# Patient Record
Sex: FEMALE | Race: WHITE | NOT HISPANIC OR LATINO
[De-identification: names, ages, dates, MRNs, and addresses within clinical notes are randomized per-mention and may not be internally consistent; named-entity substitution may affect disease eponyms.]

---

## 2021-08-19 ENCOUNTER — FORM ENCOUNTER (OUTPATIENT)
Age: 76
End: 2021-08-19

## 2022-01-13 PROBLEM — Z00.00 ENCOUNTER FOR PREVENTIVE HEALTH EXAMINATION: Status: ACTIVE | Noted: 2022-01-13

## 2022-02-04 ENCOUNTER — APPOINTMENT (OUTPATIENT)
Dept: NEUROLOGY | Facility: CLINIC | Age: 77
End: 2022-02-04
Payer: MEDICARE

## 2022-02-04 ENCOUNTER — NON-APPOINTMENT (OUTPATIENT)
Age: 77
End: 2022-02-04

## 2022-02-04 VITALS
HEART RATE: 78 BPM | BODY MASS INDEX: 17.75 KG/M2 | HEIGHT: 61 IN | TEMPERATURE: 97.9 F | OXYGEN SATURATION: 98 % | DIASTOLIC BLOOD PRESSURE: 69 MMHG | SYSTOLIC BLOOD PRESSURE: 110 MMHG | WEIGHT: 94 LBS

## 2022-02-04 DIAGNOSIS — M79.2 NEURALGIA AND NEURITIS, UNSPECIFIED: ICD-10-CM

## 2022-02-04 DIAGNOSIS — M54.2 CERVICALGIA: ICD-10-CM

## 2022-02-04 PROCEDURE — 99205 OFFICE O/P NEW HI 60 MIN: CPT

## 2022-02-04 NOTE — HISTORY OF PRESENT ILLNESS
[FreeTextEntry1] : The patient is a 76-year-old female with a history of degenerative disease of the spine presenting for evaluation of possible TIA early December.\par \par The patient states she was sitting with her  and suddenly had difficulty expressing herself.  Her speech was garbled.  She had no other associated deficits.  Symptoms lasted perhaps up to 1 minute before resolving.  She may or may not have had a mild headache.  She did not seek medical attention immediately.  However, her cardiologist recently began the work-up and performed a carotid ultrasound and transthoracic echocardiogram which were unremarkable per her report.  She has not had cardiac monitoring.  She has not started aspirin as she has had difficulties with nosebleeds in the past.\par \par Otherwise, the patient describes a episode in October 2021 of severe, sudden onset posterior neck pain.  The pain was so severe that she began crying and presented to the ER.  She had a cervical x-ray performed that showed degenerative changes in the cervical spine.  She has been doing physical therapy with some improvement.  She also has noted pain in her left arm which has been attributed to possible shoulder issue.

## 2022-02-04 NOTE — ASSESSMENT
[FreeTextEntry1] : Patient is a very pleasant 76-year-old female presenting for evaluation of possible TIA early December.  Symptoms are concerning.  We will complete the TIA work-up with MRI brain, head and neck vessel imaging, and prolonged cardiac monitoring.  If MRI is negative for hemorrhage, she will start aspirin 81 mg every other day given her history of nosebleeds.  She will send me the results of recent lab work from her PCP.\par \par Otherwise, given the patient's persistent neck discomfort and possible radicular pain to the left arm, I have ordered an MRI cervical spine for further evaluation.  She will continue therapy in the interim.

## 2022-02-04 NOTE — PHYSICAL EXAM
[FreeTextEntry1] : Alert.  Fully oriented.  Speech and language are intact.  Cranial nerves II-XII are intact.  Motor exam reveals intact strength with individual muscle testing in bilateral upper and lower extremities.  Tone is normal.  Reflexes are normal.  Toes are downgoing.  Sensation is intact to light touch in distal extremities.  Finger-to-nose and heel-to-shin are intact.  Rapid alternating movements are normal in the upper and lower extremities.  Gait is normal. She is able to walk on heels, toes, and in tandem without difficulty. Romberg negative.\par

## 2022-02-13 ENCOUNTER — RESULT REVIEW (OUTPATIENT)
Age: 77
End: 2022-02-13

## 2022-02-13 ENCOUNTER — OUTPATIENT (OUTPATIENT)
Dept: OUTPATIENT SERVICES | Facility: HOSPITAL | Age: 77
LOS: 1 days | End: 2022-02-13

## 2022-02-13 ENCOUNTER — APPOINTMENT (OUTPATIENT)
Dept: MRI IMAGING | Facility: CLINIC | Age: 77
End: 2022-02-13
Payer: MEDICARE

## 2022-02-13 DIAGNOSIS — Z90.89 ACQUIRED ABSENCE OF OTHER ORGANS: Chronic | ICD-10-CM

## 2022-02-13 PROCEDURE — 72141 MRI NECK SPINE W/O DYE: CPT | Mod: 26,ME

## 2022-02-13 PROCEDURE — 70544 MR ANGIOGRAPHY HEAD W/O DYE: CPT | Mod: 26,MG,59

## 2022-02-13 PROCEDURE — 70547 MR ANGIOGRAPHY NECK W/O DYE: CPT | Mod: 26,MG

## 2022-02-13 PROCEDURE — G1004: CPT

## 2022-02-13 PROCEDURE — 70551 MRI BRAIN STEM W/O DYE: CPT | Mod: 26,ME

## 2022-02-14 ENCOUNTER — TRANSCRIPTION ENCOUNTER (OUTPATIENT)
Age: 77
End: 2022-02-14

## 2022-02-14 ENCOUNTER — NON-APPOINTMENT (OUTPATIENT)
Age: 77
End: 2022-02-14

## 2022-03-25 ENCOUNTER — TRANSCRIPTION ENCOUNTER (OUTPATIENT)
Age: 77
End: 2022-03-25

## 2022-04-22 ENCOUNTER — APPOINTMENT (OUTPATIENT)
Dept: NEUROLOGY | Facility: CLINIC | Age: 77
End: 2022-04-22
Payer: MEDICARE

## 2022-04-22 VITALS
SYSTOLIC BLOOD PRESSURE: 132 MMHG | BODY MASS INDEX: 17.3 KG/M2 | WEIGHT: 94 LBS | HEART RATE: 79 BPM | OXYGEN SATURATION: 98 % | HEIGHT: 62 IN | TEMPERATURE: 98.3 F | DIASTOLIC BLOOD PRESSURE: 70 MMHG

## 2022-04-22 DIAGNOSIS — G45.9 TRANSIENT CEREBRAL ISCHEMIC ATTACK, UNSPECIFIED: ICD-10-CM

## 2022-04-22 PROCEDURE — 99214 OFFICE O/P EST MOD 30 MIN: CPT

## 2022-04-22 NOTE — ASSESSMENT
[FreeTextEntry1] : The patient is a 76-year-old female with a history of degenerative disease of the spine and anxiety presenting for evaluation of suspected TIA early December here for follow-up.  Thankfully, the patient has been doing well.  She has had no recurrent strokelike symptoms.  I have again encouraged her to consider daily aspirin for optimal benefit.  Given the presence of atrial flutter, however, my preference is actually to transition to Eliquis for better stroke prevention.  Before doing so, I would like to confirm the rhythm was in fact atrial flutter with the patient's cardiologist.  Ideally she would also be on a statin as well.  \par \par We discussed in detail the pathophysiology of small vessel disease including the etiology as well as preventative measures including antiplatelet therapy, statin therapy to control cholesterol to goal LDL less than 70, normal BP, and an otherwise healthy lifestyle through diet, exercise, etc.\par \par RTC 6 months.\par

## 2022-04-22 NOTE — HISTORY OF PRESENT ILLNESS
[FreeTextEntry1] : The patient is a 76-year-old female with a history of degenerative disease of the spine and anxiety presenting for evaluation of suspected TIA early December. She is here for follow-up.\par \par Since our last visit, the patient underwent MRI which showed evidence of small vessel disease within the leonid and the cerebral hemispheres.  There were no acute findings.  MRA of the neck showed no significant stenosis.  There was a possible infundibulum of the right supraclinoid ICA on MRA head.  MRI C-spine showed known degenerative changes.  She underwent a 30-day cardiac monitor, the results of which are scanned into the system.  This did reveal reveal atrial flutter.  Otherwise, the patient has been taking aspirin twice per week after discussing her concern for nosebleeds with her PCP given her past history.  If tolerated, she plans to increase to 3 times weekly.  She is not on statin therapy.\par

## 2022-04-29 ENCOUNTER — TRANSCRIPTION ENCOUNTER (OUTPATIENT)
Age: 77
End: 2022-04-29

## 2022-05-02 ENCOUNTER — TRANSCRIPTION ENCOUNTER (OUTPATIENT)
Age: 77
End: 2022-05-02

## 2022-05-26 ENCOUNTER — APPOINTMENT (OUTPATIENT)
Dept: CT IMAGING | Facility: CLINIC | Age: 77
End: 2022-05-26
Payer: SELF-PAY

## 2022-05-26 ENCOUNTER — OUTPATIENT (OUTPATIENT)
Dept: OUTPATIENT SERVICES | Facility: HOSPITAL | Age: 77
LOS: 1 days | End: 2022-05-26

## 2022-05-26 DIAGNOSIS — Z90.89 ACQUIRED ABSENCE OF OTHER ORGANS: Chronic | ICD-10-CM

## 2022-05-26 PROCEDURE — 75571 CT HRT W/O DYE W/CA TEST: CPT | Mod: 26

## 2022-06-27 ENCOUNTER — TRANSCRIPTION ENCOUNTER (OUTPATIENT)
Age: 77
End: 2022-06-27

## 2022-06-29 ENCOUNTER — TRANSCRIPTION ENCOUNTER (OUTPATIENT)
Age: 77
End: 2022-06-29

## 2022-11-11 ENCOUNTER — TRANSCRIPTION ENCOUNTER (OUTPATIENT)
Age: 77
End: 2022-11-11

## 2022-11-22 ENCOUNTER — APPOINTMENT (OUTPATIENT)
Dept: NEUROLOGY | Facility: CLINIC | Age: 77
End: 2022-11-22

## 2023-01-03 ENCOUNTER — FORM ENCOUNTER (OUTPATIENT)
Age: 78
End: 2023-01-03

## 2023-01-04 ENCOUNTER — APPOINTMENT (OUTPATIENT)
Dept: NEUROLOGY | Facility: CLINIC | Age: 78
End: 2023-01-04

## 2023-01-13 ENCOUNTER — APPOINTMENT (OUTPATIENT)
Dept: NEUROLOGY | Facility: CLINIC | Age: 78
End: 2023-01-13

## 2023-05-05 ENCOUNTER — APPOINTMENT (OUTPATIENT)
Dept: NEUROLOGY | Facility: CLINIC | Age: 78
End: 2023-05-05
Payer: MEDICARE

## 2023-05-05 DIAGNOSIS — R47.01 APHASIA: ICD-10-CM

## 2023-05-05 PROCEDURE — 99214 OFFICE O/P EST MOD 30 MIN: CPT | Mod: 95

## 2023-05-05 NOTE — HISTORY OF PRESENT ILLNESS
[Home] : at home, [unfilled] , at the time of the visit. [Medical Office: ([facility])___] : at the medical office located in  [Verbal consent obtained from patient] : the patient, [unfilled] [FreeTextEntry1] : The patient is a 77-year-old female with a history of degenerative disease of the spine and anxiety presenting for evaluation of a possible TIA in 12/2021. She is here for follow-up.\par \par SInce our last visit, the patient states she has been doing well. She had one 2-3 second episode of expressive speech difficulties w/o other associated symptoms, very similar to her prior event which led to the initial consultation. She has not had any other symptoms. Of note, prior MRA head/neck showed no significant stenosis and cardiac workup was also unrevealing. She has been taking aspirin 2X per week but after this episode, increased to 3 times per week. She is not on statin therapy.

## 2023-05-05 NOTE — ASSESSMENT
[FreeTextEntry1] : The patient is a 77-year-old female with a history of degenerative disease of the spine and anxiety presenting for evaluation of a possible TIA of isolated expressive aphasia in 12/2021 with a recurrent, similar spell more recently.  We discussed differential includes TIA vs focal seizures and less likely early symptoms of PPA.  She would like ot minimize testing if possible. Plan for MRA head/neck (will also include brain MRI). She is seeing her cardiologist soon so I will also reach out to her. Ideally would have longer term cardiac monitoring. Rec she take daily aspirin and statin therapy which she has not been taking at the recommendation of her PCP/cardiologist. Routine EEG to eval for focal seizures. PET Brain could be considered for PPA eval but given low suspicion, will hold off for now.

## 2023-05-09 ENCOUNTER — TRANSCRIPTION ENCOUNTER (OUTPATIENT)
Age: 78
End: 2023-05-09

## 2023-05-10 ENCOUNTER — TRANSCRIPTION ENCOUNTER (OUTPATIENT)
Age: 78
End: 2023-05-10

## 2023-05-15 ENCOUNTER — TRANSCRIPTION ENCOUNTER (OUTPATIENT)
Age: 78
End: 2023-05-15

## 2023-05-25 ENCOUNTER — APPOINTMENT (OUTPATIENT)
Dept: NEUROLOGY | Facility: CLINIC | Age: 78
End: 2023-05-25
Payer: MEDICARE

## 2023-05-25 PROCEDURE — 95819 EEG AWAKE AND ASLEEP: CPT

## 2023-05-30 ENCOUNTER — OUTPATIENT (OUTPATIENT)
Dept: OUTPATIENT SERVICES | Facility: HOSPITAL | Age: 78
LOS: 1 days | End: 2023-05-30
Payer: MEDICARE

## 2023-05-30 ENCOUNTER — APPOINTMENT (OUTPATIENT)
Dept: MRI IMAGING | Facility: HOSPITAL | Age: 78
End: 2023-05-30

## 2023-05-30 ENCOUNTER — RESULT REVIEW (OUTPATIENT)
Age: 78
End: 2023-05-30

## 2023-05-30 DIAGNOSIS — Z90.89 ACQUIRED ABSENCE OF OTHER ORGANS: Chronic | ICD-10-CM

## 2023-05-30 PROCEDURE — 70547 MR ANGIOGRAPHY NECK W/O DYE: CPT | Mod: 26,MH

## 2023-05-30 PROCEDURE — 70547 MR ANGIOGRAPHY NECK W/O DYE: CPT | Mod: MH

## 2023-05-30 PROCEDURE — 70544 MR ANGIOGRAPHY HEAD W/O DYE: CPT | Mod: MH

## 2023-05-30 PROCEDURE — 70544 MR ANGIOGRAPHY HEAD W/O DYE: CPT | Mod: 26,MH

## 2023-07-25 ENCOUNTER — TRANSCRIPTION ENCOUNTER (OUTPATIENT)
Age: 78
End: 2023-07-25

## 2023-07-27 ENCOUNTER — APPOINTMENT (OUTPATIENT)
Dept: NEUROLOGY | Facility: CLINIC | Age: 78
End: 2023-07-27
Payer: MEDICARE

## 2023-07-28 ENCOUNTER — APPOINTMENT (OUTPATIENT)
Dept: NEUROLOGY | Facility: CLINIC | Age: 78
End: 2023-07-28

## 2023-07-28 PROCEDURE — 95700 EEG CONT REC W/VID EEG TECH: CPT

## 2023-07-28 PROCEDURE — 95708 EEG WO VID EA 12-26HR UNMNTR: CPT

## 2023-07-28 PROCEDURE — 95719 EEG PHYS/QHP EA INCR W/O VID: CPT

## 2023-08-02 ENCOUNTER — APPOINTMENT (OUTPATIENT)
Dept: NEUROLOGY | Facility: CLINIC | Age: 78
End: 2023-08-02

## 2023-08-03 ENCOUNTER — TRANSCRIPTION ENCOUNTER (OUTPATIENT)
Age: 78
End: 2023-08-03

## 2024-01-08 NOTE — END OF VISIT
[Time Spent: ___ minutes] : I have spent [unfilled] minutes of time on the encounter.
There were no significant procedures or tests performed during this admission.

## 2024-05-21 ENCOUNTER — APPOINTMENT (OUTPATIENT)
Dept: RADIOLOGY | Facility: CLINIC | Age: 79
End: 2024-05-21
Payer: MEDICARE

## 2024-05-21 ENCOUNTER — APPOINTMENT (OUTPATIENT)
Dept: MAMMOGRAPHY | Facility: CLINIC | Age: 79
End: 2024-05-21
Payer: MEDICARE

## 2024-05-21 ENCOUNTER — OUTPATIENT (OUTPATIENT)
Dept: OUTPATIENT SERVICES | Facility: HOSPITAL | Age: 79
LOS: 1 days | End: 2024-05-21

## 2024-05-21 DIAGNOSIS — Z90.89 ACQUIRED ABSENCE OF OTHER ORGANS: Chronic | ICD-10-CM

## 2024-05-21 PROCEDURE — 77063 BREAST TOMOSYNTHESIS BI: CPT | Mod: 26

## 2024-05-21 PROCEDURE — 77067 SCR MAMMO BI INCL CAD: CPT | Mod: 26

## 2024-05-21 PROCEDURE — 77080 DXA BONE DENSITY AXIAL: CPT | Mod: 26

## 2025-07-08 ENCOUNTER — APPOINTMENT (OUTPATIENT)
Dept: MAMMOGRAPHY | Facility: CLINIC | Age: 80
End: 2025-07-08
Payer: MEDICARE

## 2025-07-08 ENCOUNTER — OUTPATIENT (OUTPATIENT)
Dept: OUTPATIENT SERVICES | Facility: HOSPITAL | Age: 80
LOS: 1 days | End: 2025-07-08

## 2025-07-08 DIAGNOSIS — Z90.89 ACQUIRED ABSENCE OF OTHER ORGANS: Chronic | ICD-10-CM

## 2025-07-08 PROCEDURE — 77067 SCR MAMMO BI INCL CAD: CPT | Mod: 26

## 2025-07-08 PROCEDURE — 77063 BREAST TOMOSYNTHESIS BI: CPT | Mod: 26
